# Patient Record
Sex: FEMALE | Race: WHITE
[De-identification: names, ages, dates, MRNs, and addresses within clinical notes are randomized per-mention and may not be internally consistent; named-entity substitution may affect disease eponyms.]

---

## 2018-02-09 NOTE — DIAG
[facility]
                                    [address] [facility] [address]
                                  [place], [place]  [address]
_________________________________________________________________________________________
                                                                 Draft    
 
 
DATE OF STUDY:
02/09/2018
 
STUDY:
Holter monitor.
 
INDICATION:
This is a 62-year-old female referred for evaluation of palpitations.
 
SUMMARY REPORT:
The patient was monitored for 48 hours and 13 minutes.  There was total of 10 hours of
artifact, total beats recorded 130,956 beats.  The patient was predominantly in sinus
rhythm with average heart rate of 56 beats per minute.  Minimum heart rate 42 beats per
minute, noted to be sinus bradycardia and maximum heart rate of 100 beats per minute.
Arrhythmia was noted as rare premature atrial and ventricular contractions. 
 
CONCLUSION:
1. This is a 48-hour Holter monitor, however, there was 10 hours of artifact that were
removed, total beats recorded 130,956 beats. 
2. The patient was predominantly in sinus rhythm with poor correlation between patient
triggered events and recorded arrhythmia. 
3. Average heart rate was 56 beats per minute, minimum heart rate 42 beats per minute,
and maximum heart rate 100 beats per minute. 
4. Arrhythmia was noted as rare premature atrial and ventricular contraction.
 
 
 
            ________________________________________
            Dustin Demarco MD MA/KAREN
Job #:  [id number]
DD:  02/13/2018 18:58:47
DT:  02/14/2018 03:31:15
 
MEIR Burton M.D.
 
 
 
 
 
 
 
 
                                                                                    
_________________________________________________________________________________________
PATIENT NAME:     CRYSTAL PHILLIP                    
MEDICAL RECORD #: [id number]                     DIAGNOSTIC STUDY              
          ACCT #: [id number]  
DATE OF BIRTH:   [birth year]                                       
PHYSICIAN:   DUSTIN DEMARCO MD                REPORT #: [id number]
REPORT IS CONFIDENTIAL AND NOT TO BE RELEASED WITHOUT AUTHORIZATION

## 2018-02-10 NOTE — EKG
[facility]
                                    [address]
                                  [place]  [address]
_________________________________________________________________________________________
                                                                 Signed   
 
 
Normal sinus rhythm
Possible Left atrial enlargement
Borderline ECG
No previous ECGs available
Confirmed by OLAMIDE WILSON MD ([id number]) on 2/10/2018 5:47:54 PM
 
 
 
 
 
 
 
 
 
 
 
 
 
 
 
 
 
 
 
 
 
 
 
 
 
 
 
 
 
 
 
 
 
 
 
 
 
 
 
 
    Electronically Signed By: OLAMIDE WILSON MD  02/10/18 1748
_________________________________________________________________________________________
PATIENT NAME:     CRYSTAL PHILLIP                    
MEDICAL RECORD #: [id number]                     Electrocardiogram             
          ACCT #: [id number]  
DATE OF BIRTH:   [birth year]                                       
PHYSICIAN:   OLAMIDE WILSON MD           REPORT #: [id number]
REPORT IS CONFIDENTIAL AND NOT TO BE RELEASED WITHOUT AUTHORIZATION

## 2018-10-05 NOTE — NUR
10/05/18 Magdalena Mei
1152- PT ARRIVES TO PACU WITH EYES CLOSED, ASLEEP. PT RESPONDS TO
VERBAL STIMULI AND OPENS EYES. PT ABLE TO ANSWER QUESTIONS AND STATES
THAT SHE DOES NOT HAVE PAIN OR NAUSEA. PT STABLE ON RA SATING 90-93%.

## 2018-10-06 NOTE — OR
[facility]
                                    [address], [place]  [address]
_________________________________________________________________________________________
                                                                 Signed   
 
 
DATE OF OPERATION:
10/05/2018
 
SURGEON:
Leidy Gr MD
 
PREOPERATIVE DIAGNOSIS:
Persistent weight loss and poor appetite.
 
POSTOPERATIVE DIAGNOSES:
1. Prepyloric antral gastritis.
2. Normal-appearing colon.
 
PROCEDURES PERFORMED:
1. Esophagogastroduodenoscopy with biopsy.
2. Total colonoscopy.
 
ANESTHESIA:
Intravenous sedation fentanyl 200 mcg, Versed 10 mg.
 
INDICATION:
This 63-year-old white woman is a patient of Dr. Burton and well known to me from the
past.  She works as an emergency room nurse, semi-retired now.  She continues to smoke
one pack of cigarettes a day.  In the past six months she has lost about 30 pounds of
weight.  She has had decreased appetite, but no associated abdominal pain.  She feels
"sick at the thought of food."  She has had some iron deficiency, which has been treated
with iron medication, which improved her symptoms.  She has chronic back pain and spinal
infusion device with Dilaudid.  This is being weaned.  She still smokes a pack of
cigarettes a day, though she underwent a rather extensive evaluation in the past few
years for mediastinal lymphadenopathy and periesophageal lymphadenopathy, which seemed
to resolve with further followup.  She is admitted at this time to undergo upper
endoscopy, and colonoscopy to better characterize her current symptoms.  She understands
the risks of bleeding, infection, and perforation related to colonoscopy and upper
endoscopy and wished to proceed. 
 
FINDINGS:
Upper endoscopy showed no abnormality other than mild prepyloric antral gastritis.  She
had no ulceration or neoplasm.  No sign of lymphoma. 
 
Colonoscopy showed a well prepped bowel.  There was no sign of polyps, diverticular
formation, colitis, or cancer. 
 
 
    Electronically Signed By: LEIDY GR MD  10/06/18 0908
_________________________________________________________________________________________
PATIENT NAME:     CRYSTAL PHILLIP                    
MEDICAL RECORD #: [id number]            OPERATIVE REPORT              
          ACCT #: [id number]  
DATE OF BIRTH:   [birth year]            REPORT #: [id number]      
PHYSICIAN:        LEIDY GR MD                 
PCP:              NIESHA BURTON MD      
REPORT IS CONFIDENTIAL AND NOT TO BE RELEASED WITHOUT AUTHORIZATION
 
 
                                  [facility]
                                    [address], [place]  [address]
_________________________________________________________________________________________
                                                                 Signed   
 
 
She does have a fair amount of tolerance to pain medication on the basis of her infusion
device as may have been expected. 
 
DESCRIPTION OF PROCEDURE:
The patient was brought to the endoscopy suite, and given topical Hurricaine spray
hypopharyngeal anesthesia, and placed in lateral decubitus position, and given
intravenous sedation to the point of slurred speech, and nystagmus.  An Olympus video
upper endoscope was passed in the hypopharynx after placement of a bite block.
Esophagus status length was normal.  Scope was advanced to the stomach, which was
insufflated with air.  Gastric juices suctioned free.  There was no sign of bile within
the stomach.  The pylorus appeared reasonably normal.  Scope was passed through into the
duodenum, which was normal.  Scope was withdrawn in the prepyloric antrum, had mild
antral gastritis, but not much and certainly no ulceration or neoplasm.  Biopsies were
obtained for both KRYSTINA and pathologic testing.  Retroflexed view was undertaken showing a
reasonable flap valve in proximal stomach, otherwise normal.  Scope was straightened,
withdrawn to the distal esophagus, and plans made for colonoscopy. 
 
The table was rotated and additional sedation given and digital rectal examination
undertaken, which was normal.  An Olympus video colonoscope was passed in the rectum and
manipulated throughout the colon.  She had a fair amount of discomfort requiring
additional sedation no doubt related to tolerance from her usual medications of
Duramorph through infusion.  Ultimately, the right colon was entered and the cecum was
visualized, but not intubated proper.  Further attempts at intubation were not likely
possible, and definitely not well tolerated given her resistance to pain medications.
The scope was withdrawn from that point and, examination throughout showed no sign of
abnormality.  The scope was removed, and the patient was taken to recovery room in good
condition. 
 
CONCLUDING DIAGNOSIS:
Mild amount of prepyloric antral gastritis.  No sign of ulcer or neoplasm.  Normal colon.
 
PLAN:
On short-term we will recommend Prilosec 20 mg p.o. daily.  An evaluation of ultrasound
and CCK-HIDA test has been outlined, and will confirm that those tests are done. 
 
 
 
            ________________________________________
            MD EMMETT Sharpe/MODL
 
    Electronically Signed By: LEIDY GR MD  10/06/18 0908
_________________________________________________________________________________________
PATIENT NAME:     CRYSTAL PHILLIP                    
MEDICAL RECORD #: [id number]            OPERATIVE REPORT              
          ACCT #: [id number]  
DATE OF BIRTH:   [birth year]            REPORT #: [id number]      
PHYSICIAN:        LEIDY GR MD                 
PCP:              NIESHA BURTON MD      
REPORT IS CONFIDENTIAL AND NOT TO BE RELEASED WITHOUT AUTHORIZATION
 
 
                                  [address]  [address]
_________________________________________________________________________________________
                                                                 Signed   
 
 
Job #:  [id number]
DD:  10/05/2018 12:04:45
DT:  10/05/2018 18:40:24
 
cc:            Dr. Burton.
 
 
Copies:                                
~
 
 
 
 
 
 
 
 
 
 
 
 
 
 
 
 
 
 
 
 
 
 
 
 
 
 
 
 
 
 
 
 
 
 
    Electronically Signed By: LEIDY GR MD  10/06/18 0908
_________________________________________________________________________________________
PATIENT NAME:     CRYSTAL PHILLIP                    
MEDICAL RECORD #: [id number]            OPERATIVE REPORT              
          ACCT #: [id number]  
DATE OF BIRTH:   [birth year]            REPORT #: [id number]      
PHYSICIAN:        LEIDY GR MD                 
PCP:              NIESHA BURTON MD      
REPORT IS CONFIDENTIAL AND NOT TO BE RELEASED WITHOUT AUTHORIZATION

## 2021-11-14 ENCOUNTER — HOSPITAL ENCOUNTER (EMERGENCY)
Dept: HOSPITAL 46 - ED | Age: 66
LOS: 1 days | Discharge: HOME | End: 2021-11-15
Payer: MEDICARE

## 2021-11-14 VITALS — WEIGHT: 136 LBS | HEIGHT: 63 IN | BODY MASS INDEX: 24.1 KG/M2

## 2021-11-14 DIAGNOSIS — E78.00: ICD-10-CM

## 2021-11-14 DIAGNOSIS — Z20.822: ICD-10-CM

## 2021-11-14 DIAGNOSIS — F17.200: ICD-10-CM

## 2021-11-14 DIAGNOSIS — R50.9: ICD-10-CM

## 2021-11-14 DIAGNOSIS — L98.9: ICD-10-CM

## 2021-11-14 DIAGNOSIS — Z79.899: ICD-10-CM

## 2021-11-14 DIAGNOSIS — F32.9: Primary | ICD-10-CM

## 2021-11-14 DIAGNOSIS — E03.9: ICD-10-CM

## 2021-11-14 DIAGNOSIS — Z88.8: ICD-10-CM

## 2021-11-14 DIAGNOSIS — Z90.710: ICD-10-CM

## 2021-11-14 DIAGNOSIS — Z90.89: ICD-10-CM

## 2021-11-14 PROCEDURE — C9803 HOPD COVID-19 SPEC COLLECT: HCPCS

## 2021-11-14 PROCEDURE — U0003 INFECTIOUS AGENT DETECTION BY NUCLEIC ACID (DNA OR RNA); SEVERE ACUTE RESPIRATORY SYNDROME CORONAVIRUS 2 (SARS-COV-2) (CORONAVIRUS DISEASE [COVID-19]), AMPLIFIED PROBE TECHNIQUE, MAKING USE OF HIGH THROUGHPUT TECHNOLOGIES AS DESCRIBED BY CMS-2020-01-R: HCPCS

## 2021-11-14 PROCEDURE — A9270 NON-COVERED ITEM OR SERVICE: HCPCS

## 2021-11-14 PROCEDURE — 0T9B30Z DRAINAGE OF BLADDER WITH DRAINAGE DEVICE, PERCUTANEOUS APPROACH: ICD-10-PCS | Performed by: EMERGENCY MEDICINE

## 2021-11-14 NOTE — XMS
PreManage Notification: CRYSTAL PHILLIP MRN:[id number]
 
Security Information
 
Security Events
No recent Security Events currently on file
 
 
 
CRITERIA MET
------------
- [facility]
 
 
CARE PROVIDERS
There are no care providers on record at this time.
 
Mica has no Care Guidelines for this patient.
 
KATELYN VISIT COUNT (12 MO.)
-------------------------------------------------------------------------------------
1 [facility]
-------------------------------------------------------------------------------------
TOTAL 1
-------------------------------------------------------------------------------------
NOTE: Visits indicate total known visits.
 
ED/C VISIT TRACKING (12 MO.)
-------------------------------------------------------------------------------------
11/14/2021 12:38
[facility] [place]
 
TYPE: Emergency
 
COMPLAINT:
- FLU SYMPTOMS
-------------------------------------------------------------------------------------
 
 
INPATIENT VISIT TRACKING (12 MO.)
No inpatient visits to display in this time frame
 
[email or web address]

## 2021-11-15 NOTE — EKG
[facility]
                                    [address]
                                  [place]  [address]
_________________________________________________________________________________________
                                                                 Signed   
 
 
Normal sinus rhythm
Rightward axis
ST \T\ T wave abnormality, consider anterolateral ischemia Abnormal ECG When compared
with ECG of 09-FEB-2018 00:14,
Nonspecific T wave abnormality now evident in Inferior leads
T wave inversion more evident in Anterolateral leads
Confirmed by BUBBA BELL MD ([id number]) on 11/15/2021 4:58:31 AM
 
 
 
 
 
 
 
 
 
 
 
 
 
 
 
 
 
 
 
 
 
 
 
 
 
 
 
 
 
 
 
 
 
 
 
 
 
 
    Electronically Signed By: BUBBA BELL MD  11/15/21 0458
_________________________________________________________________________________________
PATIENT NAME:     CRYSTAL PHILLIP                    
MEDICAL RECORD #: [id number]                     Electrocardiogram             
          ACCT #: [id number]  
DATE OF BIRTH:   [birth year]                                       
PHYSICIAN:   BUBBA BELL MD                   REPORT #: [id number]
REPORT IS CONFIDENTIAL AND NOT TO BE RELEASED WITHOUT AUTHORIZATION

## 2021-11-20 ENCOUNTER — HOSPITAL ENCOUNTER (EMERGENCY)
Dept: HOSPITAL 46 - ED | Age: 66
Discharge: HOME | End: 2021-11-20
Payer: MEDICARE

## 2021-11-20 VITALS — BODY MASS INDEX: 21.97 KG/M2 | WEIGHT: 124.01 LBS | HEIGHT: 63 IN

## 2021-11-20 DIAGNOSIS — Z79.899: ICD-10-CM

## 2021-11-20 DIAGNOSIS — Z90.89: ICD-10-CM

## 2021-11-20 DIAGNOSIS — S09.90XA: Primary | ICD-10-CM

## 2021-11-20 DIAGNOSIS — R10.31: ICD-10-CM

## 2021-11-20 DIAGNOSIS — Z88.8: ICD-10-CM

## 2021-11-20 DIAGNOSIS — F17.200: ICD-10-CM

## 2021-11-20 DIAGNOSIS — Z90.710: ICD-10-CM

## 2021-11-20 DIAGNOSIS — M54.9: ICD-10-CM
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
## 2021-11-20 NOTE — XMS
PreManage Notification: CRYSTAL PHILLIP MRN:[id number]
 
Security Information
 
Security Events
No recent Security Events currently on file
 
 
 
CRITERIA MET
------------
- [facility] - 2 Visits in 30 Days
 
 
CARE PROVIDERS
There are no care providers on record at this time.
 
Mica has no Care Guidelines for this patient.
 
KATELYN VISIT COUNT (12 MO.)
-------------------------------------------------------------------------------------
[facility]
-------------------------------------------------------------------------------------
TOTAL 2
-------------------------------------------------------------------------------------
NOTE: Visits indicate total known visits.
 
ED/[facility] VISIT TRACKING (12 MO.)
-------------------------------------------------------------------------------------
[facility]21 13:[facility]
 
[facility]: [facility]
 
COMPLAINT:
- FALL, BACK PAIN
-------------------------------------------------------------------------------------
11/14/2021 12:38
[facility]
 
TYPE: Emergency
 
COMPLAINT:
- FLU SYMPTOMS
 
DIAGNOSES:
- Hypothyroidism, unspecified
- Acquired absence of other organs
- Major depressive disorder, single episode, unspecified
- Fever, unspecified
- Disorder of the skin and subcutaneous tissue, unspecified
- Allergy status to other drugs, medicaments and biological substances
- Pure hypercholesterolemia, unspecified
- Nicotine dependence, unspecified, uncomplicated
- Other long term (current) drug therapy
- Acquired absence of both cervix and uterus
-------------------------------------------------------------------------------------
 
 
 
INPATIENT VISIT TRACKING (12 MO.)
No inpatient visits to display in this time frame
 
[email or web address]

## 2021-11-24 ENCOUNTER — HOSPITAL ENCOUNTER (INPATIENT)
Dept: HOSPITAL 46 - ED | Age: 66
LOS: 4 days | Discharge: HOME | DRG: 644 | End: 2021-11-28
Attending: INTERNAL MEDICINE | Admitting: INTERNAL MEDICINE
Payer: MEDICARE

## 2021-11-24 VITALS — WEIGHT: 110.45 LBS | BODY MASS INDEX: 19.57 KG/M2 | HEIGHT: 63 IN

## 2021-11-24 DIAGNOSIS — Z66: ICD-10-CM

## 2021-11-24 DIAGNOSIS — E86.0: ICD-10-CM

## 2021-11-24 DIAGNOSIS — E78.00: ICD-10-CM

## 2021-11-24 DIAGNOSIS — R00.1: ICD-10-CM

## 2021-11-24 DIAGNOSIS — Z88.8: ICD-10-CM

## 2021-11-24 DIAGNOSIS — K20.90: ICD-10-CM

## 2021-11-24 DIAGNOSIS — G89.29: ICD-10-CM

## 2021-11-24 DIAGNOSIS — Z98.890: ICD-10-CM

## 2021-11-24 DIAGNOSIS — L98.499: ICD-10-CM

## 2021-11-24 DIAGNOSIS — F17.200: ICD-10-CM

## 2021-11-24 DIAGNOSIS — Z96.652: ICD-10-CM

## 2021-11-24 DIAGNOSIS — B17.9: ICD-10-CM

## 2021-11-24 DIAGNOSIS — I10: ICD-10-CM

## 2021-11-24 DIAGNOSIS — Z90.710: ICD-10-CM

## 2021-11-24 DIAGNOSIS — K29.50: ICD-10-CM

## 2021-11-24 DIAGNOSIS — Z20.822: ICD-10-CM

## 2021-11-24 DIAGNOSIS — K76.9: ICD-10-CM

## 2021-11-24 DIAGNOSIS — Z79.899: ICD-10-CM

## 2021-11-24 DIAGNOSIS — E03.9: Primary | ICD-10-CM

## 2021-11-24 DIAGNOSIS — E78.5: ICD-10-CM

## 2021-11-24 DIAGNOSIS — E87.6: ICD-10-CM

## 2021-11-24 DIAGNOSIS — Z90.49: ICD-10-CM

## 2021-11-24 PROCEDURE — U0003 INFECTIOUS AGENT DETECTION BY NUCLEIC ACID (DNA OR RNA); SEVERE ACUTE RESPIRATORY SYNDROME CORONAVIRUS 2 (SARS-COV-2) (CORONAVIRUS DISEASE [COVID-19]), AMPLIFIED PROBE TECHNIQUE, MAKING USE OF HIGH THROUGHPUT TECHNOLOGIES AS DESCRIBED BY CMS-2020-01-R: HCPCS

## 2021-11-24 PROCEDURE — C9803 HOPD COVID-19 SPEC COLLECT: HCPCS

## 2021-11-24 NOTE — NUR
PT ARRIVED TO ROOM 126 AT 1930 VIA STRETCHER.  PT ABLE TO AMBULATE TO BATHROOM
TO VOID (400ML YELLOW URINE) AND THEN BACK TO BED.  SHE IS SLIGHTLY DROWSY
WITH A FLAT AFFECT, BUT ORIENTED.  DENIES PAIN.  LUNGS CLEAR, RA.  HR REGULAR,
RATE ANKIT IN 40'S.  BOWEL TONES HYPOACTIVE, REPORTS NAUSEA, PRN ZOFRAN GIVEN.
PT HAD 150ML GREEN EMESIS, PRN PHENERGAN GIVEN (6.25MG GIVEN TO TITRATE TO
FULL DOSE AS IT HAD BEEN RECENTLY GIVEN IN E.D.)  SKIN COOL, SCATTERED
ABRASIONS AND SCABS NOTED TO BUE.  IV INTACT AND PATENT.  LR INFUSION STARTED
AT 125ML/HR.  POTASSIUM RIDER FINISHING FROM E.D.  PT HAS CALL LIGHT WITHIN
REACH, DENIES FURTHER NEEDS AT THIS TIME.

## 2021-11-24 NOTE — NUR
IN TO START NEXT INFUSION OF POTASSIUM.  PT CURRENTLY SLEEPING SOUNDLY, NO
APPARENT DISTRESS.  RESPIRATIONS EVEN AND UNLABORED.  HR REMAINS ANKIT IN
40'S, OTHERWISE VITAL SIGNS WNL.

## 2021-11-24 NOTE — XMS
PreManage Notification: CRYSTAL PHILLIP MRN:[id number]
 
Security Information
 
Security Events
No recent Security Events currently on file
 
 
 
CRITERIA MET
------------
- [facility] - 2 Visits in 30 Days
 
 
CARE PROVIDERS
-------------------------------------------------------------------------------------
[facility]     11/22/2021-Current
 
PHONE: [phone]
-------------------------------------------------------------------------------------
 
Mica has no Care Guidelines for this patient.
 
KATELYN VISIT COUNT (12 MO.)
-------------------------------------------------------------------------------------
3 [facility]
-------------------------------------------------------------------------------------
TOTAL 3
-------------------------------------------------------------------------------------
NOTE: Visits indicate total known visits.
 
ED/[facility] VISIT TRACKING (12 MO.)
-------------------------------------------------------------------------------------
11/24/2021 14:17
[facility] [place]
 
TYPE: Emergency
 
COMPLAINT:
- NAUSEA
-------------------------------------------------------------------------------------
11/20/2021 13:13
[facility] [place]
 
TYPE: Emergency
 
COMPLAINT:
- FALL, BACK PAIN
 
DIAGNOSES:
- Other long term (current) drug therapy
- Unspecified injury of head, initial encounter
- Acquired absence of both cervix and uterus
- Nicotine dependence, unspecified, uncomplicated
- Other fall from one level to another, initial encounter
- Acquired absence of other organs
- Right lower quadrant pain
- Allergy status to other drugs, medicaments and biological substances
- Dorsalgia, unspecified
 
-------------------------------------------------------------------------------------
11/14/2021 12:38
[facility]
 
TYPE: Emergency
 
COMPLAINT:
- FLU SYMPTOMS
 
DIAGNOSES:
- Hypothyroidism, unspecified
- Acquired absence of other organs
- Major depressive disorder, single episode, unspecified
- Fever, unspecified
- Disorder of the skin and subcutaneous tissue, unspecified
- Allergy status to other drugs, medicaments and biological substances
- Pure hypercholesterolemia, unspecified
- Nicotine dependence, unspecified, uncomplicated
- Other long term (current) drug therapy
- Acquired absence of both cervix and uterus
-------------------------------------------------------------------------------------
 
 
INPATIENT VISIT TRACKING (12 MO.)
No inpatient visits to display in this time frame
 
[email or web address]

## 2021-11-24 NOTE — NUR
IN TO START NEXT POTASSIUM INFUSION.  PT STIRRING IN BED AND MOANING.  PT
DENIES PAIN OR NAUSEA AT THIS TIME, BUT STATES SHE NEEDS TO GO TO BATHROOM.
PT MUCH MORE UNSTEADY ON FEET AT THIS TIME, IS NOW 1-PA.  PT STUMBLED, THIS RN
ABLE TO INTERVENE TO PREVENT PT FROM FALLING.  PT VOIDED 350ML YELLOW URINE
AND ASSISTED BACK TO BED.  ASSESSMENT COMPLETED.  IV REMAINS INTACT AND
PATENT.  CALL LIGHT AND BELONGINGS WITHIN REACH.

## 2021-11-25 NOTE — NUR
THIS CNA IN ROOM FOR SBA TO BR. PATIENT STEADY ON HER FEET. BACK TO BED, WARM
BLANKET PROVIDED, CALL LIGHT IN EASY REACH.

## 2021-11-25 NOTE — NUR
iv leaking at site attempted to redress iv and tighten connections still
leaking, patient states she is a hard stick and they struggled to place that
line would perfer if they tried to start it with ultrasound

## 2021-11-25 NOTE — NUR
PT TRANSFERED FROM CCU TO [facility], PATIENT ABLE TO AMBULATE TO ROOM, UPON
ARRIVAL COMPLAINING OF NAUSEA PER PATIENT REQUEST PHENEGRAN GIVEN, VSS, NO
OTHER NEEDS AT THE MOMENT, CALL LIGHT WITHIN REACH.

## 2021-11-25 NOTE — NUR
RN IN ROOM TO ANSWER CALL LIGHT, ASSISTED PATIENT TO BATHROOM TO VOID,
STARTING TO FEEL SLIGHTLY SICK PRN ZOFRAN ADMINISTERED, NO OTHER NEEDS AT THE
MOMENT

## 2021-11-25 NOTE — NUR
PLEBOTOMIST LET THIS RN KNOW THAT PT NEEDED TO USE BATHROOM.  PT UP WITH SBA,
REMAINS STEADY ON FEET AT THIS TIME, TO BATHROOM.  VOIDED 450ML YELLOW URINE
AND PROVIDED OWN PERICARE BEFORE RETURNING TO BED.  PT REPORTS NAUSEA IS
IMPROVED SINCE RECEIVING PHENERGAN.  DENIES FURTHER NEEDS AT THIS TIME, CALL
LIGHT WITHIN REACH.  BED ALARM SET FOR SAFETY.

## 2021-11-25 NOTE — NUR
IN TO HANG A NEW BAG OF IVF, PT WOKEN UP BY IV PUMP ALARMING.  PT REPORTS
NAUSEA, PRN PHENERGAN GIVEN.  PT DENIES FURTHER REQUESTS AT THIS TIME.

## 2021-11-25 NOTE — NUR
THIS RN TO ROOM TO CHECK ON PT. 1 PERSON ASSIST UP TO RESTROOM TO VOID, CNA
ASSISTING PT. 1 PERSON ASSIST BACK TO BED. PT CONTINUES TO REPORT NAUSEA.
PT DENIES PAIN. MEDICATION GIVEN. NO ADDITIONAL REQUESTS OR COMPLAINTS. BED
ALARM ON. CALL LIGHT WITHIN REACH. BED RAILS UP.

## 2021-11-25 NOTE — NUR
PT BED ALARM SOUNDING, FOUND PT WALKING TOWARDS BATHROOM. ASSISTED PT TO
BATHROOM TO VOID. PT STATES "I STILL FEEL BAD" UNABLE TO VERBALIZE SPECIFIC
COMPLAINT. ASSISTED PT BACK TO BED X1 ASSIST. BED ALARM ON, CALL LIGHT IN
REACH, SIDE RAILS UP X2. INSTRUCTED PT TO CALL RN WHEN NEEDS ASSIST,
VERBALIZED UNDERSTANDIGNG.

## 2021-11-25 NOTE — NUR
RESPONDED TO BED ALARM.  PT REPORTS NEEDING TO USE BATHROOM.  WALKED INTO
BATHROOM WITH SBA, PT MORE STEADY ON FEET AT THIS TIME.  PT VOIDED 450ML
YELLOW URINE AND PERFORMED OWN PERICARE.  PT NOW BACK IN BED.  PT REPORTS
NAUSEA, PRN ZOFRAN GIVEN.  IV REMAINS INTACT, FLUIDS INFUSING WNL.  DISCUSSED
PLAN FOR ASSESSMENT AT 0400 AND WILL REASSESS NAUSEA AT THAT TIME.  PT DENIES
FURTHER NEEDS.  BED ALARM ON FOR SAFETY.

## 2021-11-25 NOTE — NUR
CALL LIGHT ANSWERED, SBA WITH PATIENT TO BR FOR VOID. PATIENT VERY NAUSEOUS,
BACK TO BED. WARM BLANKETS PROVIDED. CALL LIGHT IN REACH

## 2021-11-25 NOTE — NUR
THIS RN TO ROOM TO CHECK ON PT. PT TALKING ON PHONE. PT DENIES PAIN AND
REPORTS NAUSEA HAS IMPROVED. PT DENIES ADDITIONAL REQUESTS OR COMPLAINTS. PT
UPATED ON PLAN OF CARE AND TRANSFER TO MED/SURG FLOOR. PT VERBALIZES
UNDERSTANDING AND STATES HER QUESTIONS HAVE BEEN ANSWERED. BED RAILS UP. CALL
LIGHT WITHIN REACH. BED ALARM ON.

## 2021-11-25 NOTE — NUR
IN TO CHECK ON PT WHO WAS AWAKE WHEN I ENTERED ROOM.  PT STATES SHE IS FEELING
OK WHEN SHE ISN'T MOVING.  PT DENIES REQUESTS OR NEEDS AT THIS TIME.  BED
ALARM REMAINS ON FOR SAFETY.  POTASSIUM INFUSIONS COMPLETED.  IVF INFUSING
WNL, IV SITE INTACT.

## 2021-11-25 NOTE — NUR
PT RESTING IN BED EYES CLOSED, RELAXED POSTURE, AND FACILA EXPRESSION, RR EVEN
AT 17 BPM, NO DISTRESS NOTED. PT NOT ALERT TO RN INTO ROOM TO INCREASE IVF
RATE TO 125ML/HR PER ORDER.

## 2021-11-25 NOTE — NUR
REPORT CALLED TO HEMANTH GONZALEZ ON MED/SURG. LISA STATES HER QUESTIONS HAVE BEEN
ANSWERED. STERLING, PHYSICAL THERAPIST, FINISHED WORKING WITH PT. REPORTS PT IS
CLEARED FROM PHYSICAL THERAPY, STEADY ON FEET. PT TRANSFERED TO MED/SURG, PT
WAS ABLE TO AMBULATE TO HER ROOM. ALL BELONGINGS TRANSFERED WITH PT.

## 2021-11-25 NOTE — NUR
PATIENT STATES SHE FEELS BETTER AND IS NOT NAUSEOUS ANY MORE, WILLING TO TRY
HAVING SOME BROTH AND JELLO. DENIES ANY OTHER NEED AT THE MOMENT

## 2021-11-25 NOTE — NUR
IN TO CHECK ON PT WHO IS CURRENTLY SLEEPING SOUNDLY.  NO APPARENT DISTRESS,
RESPIRATIONS EVEN AND UNLABORED.  NO APPARENT DISTRESS.  BED ALARM REMAINS ON
FOR SAFETY.

## 2021-11-25 NOTE — NUR
SARA, PTS SISTER CALLED FOR UPDATE. SARA UPDATED ON PT STATUS AND PLAN OF
CARE. SARA VERBALIZES UNDERSTANDING OF PLAN OF CARE AND STATES HER QUESTIONS
HAVE BEEN ANSWERED. SARA ALSO STATES SHE WILL BE BY LATER TODAY TO VISIT WITH
PT.

## 2021-11-25 NOTE — NUR
PHYSICAL THERAPY TO BEDSIDE TO WORK WITH PT. IV FLUSHED AND SALINE LOCKED PER
PROTOCOL, ALCOHOL CAP APPLIED. NO ADDITIONAL REQUESTS OR COMPLAINTS. HEAR
TRATE 70-90 WITH ACTIVITY. PT AMBULATING IN HALLWAY WITH PHYSICAL THERAPIST,
STEADY ON FEET AT THIS TIME.

## 2021-11-25 NOTE — NUR
RN IN ROOM TO GIVE PT PRN PHENGRAN FOR NAUSEA GREGORIA PLACED NEW IV IN R UPPER
ARM, IV FLUIDS RESTARTED, NEW BP MEDS AMINISTERED PATIENT EDUCATED ON NEW
MEDS.

## 2021-11-25 NOTE — NUR
MORNING ASSESSMENT AND MEDICAITON DUE. PT RESTING IN BED WITH EYES CLOSED. PT
AWAKENS TO MOVEMENT IN THE ROOM. PT CONTINUES TO REPORT NASUEA, SEE MAR FOR
MEDICATION GIVEN. PT REPORTS 3/10 BACK PAIN, IMPLANTED DILAUDID PAIN PUMP
NOTED IN PTS ABDOMEN, AT RIGHT UPPER QUADRANT. PT REPORTS THIS IS MANAGED BY
HER DOCTORS IN [place]. PT DENIES NEED FOR ADDITONAL PAIN CONTROL AT THIS
TIME. PT ORIENTED TO ALL, REMAINS DROWSY REPORTING "I JUST WANT TO SLEEP. FLAT
AFFECT NOTED. PT UNSTEADY ON HER FEET. BED ALARM ON FOR SAFETY. LUNG SOUNDS
CLEAR. HEART TONES REGULAR WITH SINUS BRADYCARDIA NOTED ON MONITOR. HEART RATE
40-50'S. BLOOD PRESSURE REMAINS ELEVATED, MD AWARE. PT REPORTS SHE HAS NOT HAD
ISSUES IN THE PAST WITH HIGH BLOOD PRESSURE. SKIN NOW WARM TO TOUCH, PT
HOWEVER REPORTS FEELING COLD. PT REPORTS HISTORY OF CONSTIPATION, DENIES
FEELINS OF CONSTIPATION AT THIS TIME. BOWEL TONES REMAIN HYPOACTIVE. PT
REPORTS OVER ALL SHE FEELS "MAYBE A LITTLE BIT BETTER." BACETRACIN OINMENT
APPLIED TO SORES ON LEFT AND RIGHT ELBOWS. PT DENIES ADDITIONAL REQUESTS OR
COMPLAINTS, IS ABLE TO TAKE HER PO MEDICATION. HEAD OF BED ELEVATED TO 25
DEGREES. BED RAILS UP. BED ALARM ON. CALL LIGHT WITHIN REACH.

## 2021-11-25 NOTE — NUR
PT RESTING IN BED REPORTS NAUSEA, ATIVAN 1MG IV GIVEN PRN AS #3 OPTION AS
OTHER OPTIONS NOT AVAILABLE AT THIS TIME. PT HAS NO EMESIS, V/S STABLE AT THIS
TIME. SHE HAS NO OTHER CONCERNS OR REQUESTS AT THIS TIME.

## 2021-11-26 NOTE — NUR
PT HAS BEEN UP TO BATHROOM 3 TIMES OVER SHIFT, VOIDING WELL, INCREASE NAUSEA
WITH ACTIVITY, ATIVAN, ZOFRAN AND PHENERGAN GIVEN ONCE EACH
OVER SHIFT FOR NAUSEA, SHE HAD ON EPISODE OF DRY HEAVE WITH NO EMESIS, PT HAS
SLEPT INTERMITTENLY OVER SHIFT. VOIDING QUANTITY SUFFICIENT. PT HAD BM LAST ON
11/24/21.

## 2021-11-26 NOTE — NUR
PT CALLED NURSES STATION TO REQUEST WITH ASSISTANCE TO THE BATHROOM, ONE
PERSON STANDBY ASSIST. VOIDED 500ML CLEAR YELLOW URINE, PT VERBALIZED NO OTHER
NEEDS AT THIS TIME, SHE GAVE NO COMPLAINTS OF NAUSEA OR PAIN.

## 2021-11-26 NOTE — NUR
PT ALERT,  ORIENTED AND STATED SHE IS JUST NOT FEELING WELL. WHEN SHE WAKES
UP HAS TROUBLE WITH NAUSEA. PT IS WAITING FOR MORE TEST RESULTS. SHE THINKS
HER THYROID IS "ALL MESSED UP". PT DISCOURAGED, MENTIONED OTHER ISSUES THAT
ARE ADDING A LOT OF STRESS TO HER LIFE. GAVE NOEMY OCONNELLPOST AND HAD PRAYER
WITH PT. GAVE ENCOURAGEMENT, WILL FOLLOW AS NEEDED

## 2021-11-26 NOTE — NUR
PT CALLED TO REPORT BEEPING IN HER ROOM, IV PUMP BATTERY NOT CHARGING,
REPLACED MACHINE, PT REPORTS SHE IS HAVING NAUSEA AGAIN, RN SAID "I WILL GO
GET THE PHENERGAN" PT SAID "PLEASE, I DONT WANT THIS TO GET OUT OF HAND AGAIN"
RN RETURN TO ROOM PT HAD EMESIS BAG AT HAND, SHE HAD ONE SMALL DRY HEAVE AS RN
ADMINISTERING PHENERGAN 12.5 MG DILUTED IN 20ML OF NS AS SLOW PUSH. NO EMESIS
NOTED IN BAG. PT RELAXED BACK IN BED BY FINISH OF IV MED PUSH. NO OTHER
CONCERNS OR REQUESTS AT THIS TIME.

## 2021-11-26 NOTE — NUR
report recieved from night shift rn, patient resting in bed, continues to have
nausea overnight but no emesis, on tele, denies any need at the moment.

## 2021-11-26 NOTE — NUR
Patient is in bed with call light in reach. Patient said she has no requests.
Vitals, I&Os are complete.

## 2021-11-26 NOTE — NUR
rn in toom to administer 1500 meds, patient requesting to go for a walk,
patient ambulated around unit sba toleralted well

## 2021-11-26 NOTE — NUR
rn in to round on pt, patient sitting up in bed tech in room assisting patient
to bathroom, no other needs at the moment

## 2021-11-26 NOTE — NUR
PATIENT RESTING QUIETLY IN LOW FOWLERS POSITION. RESPIRATIONS ARE REGULAR AND
EVEN, EYES ARE CLOSED, AND CALL LIGHT IS IN REACH. NO CURRENT CARE NEEDS ARE
NOTED.

## 2021-11-26 NOTE — NUR
PATIENT CALLED AND REMAINS NAUSEATED AND HAS ASKED TO GET EVERYTHING SHE MAY
BE DUE FOR TO STOP THE NAUSEA. IV PHENERGAN IN 20MLS SALINE ON IV PUMP OVER
10MIN GIVEN AS WELL AS 1MG IV ATIVAN. PATIENT'S IV SITE WNL AND FLUSHES WELL
POST MEDICATION AND REMAINS ON A CONTINOUS RATE. PATIENT REQUESTING LIGHTS
DOWN AND DOES NOT WANT TO BE DISTURBED UNLESS NECESSARY SO SHE CAN SLEEP. CALL
LIGHT IS IN REACH AND URINE IN BATHROOM DUMPED AND RECORDED. PATIENT HAS NO
OTHER CARE NEEDS AT THIS TIME.

## 2021-11-26 NOTE — NUR
rn in room to round on pt, patient in bed resting, complianing of nausea prn
ativan given, able to tolerate dinner tray and ate most, no emesis, states
headache is better no other needs at the moment

## 2021-11-26 NOTE — NUR
rn in room to do morning assessment and give patietn morning meds, patient
sitting up in bed, only took a couple bites of breakfast and states she feels
nauseous, prn phenegran given, no other needs at the moment.

## 2021-11-26 NOTE — NUR
PT CALLED TO REQUEST HELP TO BATHROOM. PT STANDBY ASSIST TO BATHROOM, SHE
VOIDED 600ML YELLOW URINE, SHE ASKED "CAN I HAVE THE PHENERGAN NOW?" THIS RN
CHECKED THE MAR THE ZOFRAN IS AVAILABLE, DISCUSSED WITH PT THAT I WOULD
ADMINISTER THE ZOFRAN FIRST AND IF THAT IS NOT AFFECTIVE THE PHENERGAN WILL BE
AVAILABLE. PT AGREES. WARM BLANKET PROVIDED.

## 2021-11-26 NOTE — NUR
PT REQUESTS NAUSEA MEDICATION, PT ADMINISTERED ZOFRAN AT THIS TIME 4MG IV PRN.
V/S DONE, FOCUS ASSESSMENT COMPLETE. PT VERBALIZED NO OTHER NEEDS OR CONCERNS
AT THIS TIME.

## 2021-11-26 NOTE — NUR
PT complaining of nausea despite getting reglan prior to lunch,prn zofran
provided
only ate
oranges for lunch encouraged ensure protien drink patient dislikes the taste.

## 2021-11-26 NOTE — NUR
PATIENT CALLED AND WANTS MEDICATION FOR NAUSEA AND 4MG IV ZOFRAN GIVEN ALONG
WITH PATIENT'S OTHER PO MEDS THAT PATIENT TOOK WITHOUT DIFFICULTY. PATIENT
DENIES NEED FOR STANDBY ASSISTANCE AND HAS BEEN GOING TO THE RESTROOM
INDEPENDENTLY AND IS UP TO THE BATHROOM AT THIS TIME. PATIENT WILL CALL IF SHE
NEEDS ANYTHING. ICE WATER REFILLED.

## 2021-11-26 NOTE — NUR
PT CALLED [facility]UR[facility]ES [facility] TO REPORT BEEPING NOISE IN ROOM. RN INTO ROOM, IVF
COMPLETE, NEW BAG OF LR REPLACED AND STARTED, V/S AND ASSESSMENT COMPLETE.

## 2021-11-26 NOTE — NUR
PATIENT RESTING QUIELTY IN BED WATCHING TV. THIS RN RECEIVING REPORT FROM
HEMANTH GONZALEZ. PATIENT HAS NO CURRENT CARE NEEDS AT THIS ITME. CALL LIGHT IS IN
REACH.

## 2021-11-27 NOTE — NUR
report recieved from night shift RN, patient resting in bed call light within
reach no needs at the moment.

## 2021-11-27 NOTE — NUR
PATIENT CONTINUES TO REST QUIETLY, RESPIRATIONS ARE REGULAR AND EVEN, AND EYES
ARE CLOSED. PATIENT IN LOW FOWLERS POSITION AND CALL LIGHT IS IN REACH.

## 2021-11-27 NOTE — NUR
rn in room to assess pt and do morning med pass finishing breakfast states she
feels nausea prn zofran given, no other needs at the moment

## 2021-11-27 NOTE — NUR
THIS RN RECEIVED SHIFT REPORT FROM HEMANTH GONZALEZ. PATIENT RESTING QUIETLY IN BED
AND HEMANTH GONZALEZ HANGING NEW BAG OF IV FLUIDS. PATIENT HAS NO CURRENT CARE NEEDS.
CALL LIGHT IS IN REACH.

## 2021-11-27 NOTE — NUR
AM VS AND ASSESSMENT COMPLETE AND THE ONLY THING THAT CHANGED IN THE
ASSESSMENT WAS PATIENT'S BOWEL SOUNDS ARE MORE ACTIVE NOW. PATIENT AGAIN
SEVERELY NAUSEATED AND IV PROMETHAZINE GIVEN PER PROTOCAL IN 20MLS NS AND ON
THE PUMP. IV ATIVAN ALSO GIVEN. PATIENT SAID SHE WAS ABLE TO GET SOME SLEEP
AFTER HER LAST MEDICATION. LIGHTS TURNED DOWN AT PATIENT'S REQUEST AND CALL
LIGHT IS IN REACH.

## 2021-11-27 NOTE — NUR
PATIENT CALLED AND IS NAUSEATED AGAIN AND WANTS TO GO TO SLEEP. 1MG IV ATIVAN
GIVEN AND PATIENT'S WATER DUMPED AT HER REQUEST AND SHE IS GOING NPO AT THIS
TIME. PATIENT HAS NO OTHER NEEDS AT THIS TIME AND CALL LIGHT IS IN REACH.
PATIENT WILL CALL IF NAUSEA DOES NOT SUBSIDE.

## 2021-11-27 NOTE — NUR
IN TO GET VITALS, I&Os DONE, FRESH ICE WATER, PT C/O MARYAM, RN MADE AWARE, NO
FURTHER NEEDS AT THIS TIME

## 2021-11-27 NOTE — NUR
PATIENT SLEEPING WHEN THIS RN CAME IN. PATIENT HAS VERY LITTLE NAUSEA AT THIS
TIME AMD 4MG IV ZOFRAN WAS GIVEN. DISCUSSED WITH PATIENT THAT I COULD NOT
GIVE THE PHENERGAN AND ATIVAN AT THE SAME TIME TONIGHT LIKE I DID ON MY
PREVIOUS SHIFT AS PATIENT HAD REQUESTED AND THAT ALL NAUSEA MEDICATIONS NEEDED
TO BE GIVEN AT SEPERATE TIMES. PATIENT VERBALIZED UNDERSTANDING, BUT IS
WORRIED SHE WILL NOT BE ABLE TO SLEEP NOW. ASSESSMENT COMPLETE. VS STABLE
EXCEPT FOR SOME HTN WHICH IS NOT HIGH ENOUGH TO CALL THE MD. PATIENT
RESQUESTED A NEW CUP OF HOT CHOCOLATE AND THIS WAS GIVEN WITH SOME DRY TOAST
PATIENT ALREADY HAD. PATIENT HAS NO OTHER NEEDS AT THIS TIME. CALL LIGHT IS IN
REACH AND LIGHTS TURNED DOWN.

## 2021-11-27 NOTE — NUR
PATIENT RESTING QUIETLY IN LOW FOWLERS POSITION TURNED TOWARDS HER RIGHT SIDE.
PATIENT'S EYES ARE CLOSED AND RESPIRATIONS ARE REGULAR AND EVEN. CALL LIGHT IS
IN REACH. PATIENT HAS NO CURRENT CARE NEEDS.

## 2021-11-27 NOTE — NUR
RN IN ROOM TO ROUND ON PT, PT SITTING UP IN BED SAYS SHE FEELS SICK, PRN
ATIVAN GIVEN, NO OTHER NEEDS AT THE MOMENT TRYING TO EAT DINNER

## 2021-11-27 NOTE — NUR
PATIENT RESTING IN LOW FOWLERS POSITION, RESPIRATIONS ARE REGLAR AND EVEN,
EYES ARE CLOSED, AND CALL LIGHT IS IN REACH. NO CARE NEEDS NOTED AT THIS TIME.

## 2021-11-27 NOTE — NUR
PT UP WALKING IN THE HALLS ABLE TO AMBULATE INDEPENDENTLY, COMPLAINING OF
FEELING NAUSEA ZOFRAN GIVEN, NO EMESIS.

## 2021-11-28 NOTE — NUR
PATIENT CALLED AND REMAINS NAUSEATED. 4MG IV ZOFRAN GIVEN. PATIENT ASKING WHEN
SHE CAN HAVE THE ATIVAN AND PHENERGAN AGAIN. INFORMED THE PATIENT OF THE NEXT
DOSE TIMES FOR BOTH THESE MEDICATIONS AND SHE IS WORRIED AS SHE DOES NOT THINK
THE ZOFRAN WILL WORK. INFORMED PATIENT IF SHE IS NOT FEELING BETTER IN HALF AN
HOUR WE WOULD LOOK AT CALLING THE MD. PATIENT AGREED WITH THIS PLAN. LIGHTS
TURNED DOWN AND CALL LIGHT ISIN REACH.

## 2021-11-28 NOTE — NUR
CALL LIGHT ON. pt REQUESTED PRN NAUSEA MEDS. DISCUSSED NAUSEA MED MANAGEMENT.
pt VERBALIZED UNDERSTANDING OF NEXT MEDICATION AND TIMING. NO FURTHER
REQUESTS. CALL LIGHT WITHIN REACH. PRIMARY RN UPDATED.

## 2021-11-28 NOTE — NUR
PATIENT GIVEN 1MG ATIVAN SIVP. PATIENT BACK IN BED AND AM ASSESSMENT COMPLETE,
VS ARE STABLE, PATIENT VOIDING QUANTITY SUFFICIENT, CALL LIGHT IN REACH, AND
LIGHTS TURNED DOWN AT PATIENT'S REQUEST. PATIENT WOULD LIKE TO NOT BE
DISTURBED FOR AS LONG AS POSSIBLE.

## 2021-11-28 NOTE — NUR
PATIENT IS RESTING QUIETLY IN SEMI-FOWLERS POSITION. RESPIRATIONS ARE REGULAR
AND EVEN, EYES ARE CLOSED, AND CALL LIGHT IN REACH. PATIENT HAS NO CURRENT
CARE NEEDS AT THIS TIME.

## 2021-11-28 NOTE — NUR
PATIENT UP WALKIING IN THE HALLS WITH C/O SEVERE NAUSEA. TOO SOON TO GIVE ANY
MEDICATIONS YET. THIS RN CALLED  AND ORDER GIVEN TO GIVE ATIVAN DOSE
EARLY. PATIENT HEADED BACK TO HER ROOM AND THIS RN WILL MEET HER THERE.

## 2021-11-28 NOTE — NUR
PATIENT CALLED AND NAUSEA HAS NOT IMPROVED AND PATINE WANTS SOMETHING ELSE.
1MG IV ATIVAN GIVE AS IT SEEMS TO WORK THE BEST FOR PATIENT'S NAUSEA. PATIENT
HAS NO OTHER CARE NEEDS AT THIS TIME. CALL LIGHT IS IN REACH.

## 2021-11-28 NOTE — NUR
PATIENT STILL NAUSEATED AND 12.5MG PHENERGAN IN 20MLS/NS ON THE IV PUMP OVER
10 MINUTES GIVEN WITH AM THYROID MED AND A SIP OF WATER. PATIENT REMAINS NPO
OTHER WISE. PATIENT HOPES SHE IS GOING TO BE ABLE TO GET SOME SLEEP. CALL
LIGHT IN REACH AND LIGHTS TURNED DOWN.

## 2022-11-03 ENCOUNTER — HOSPITAL ENCOUNTER (OUTPATIENT)
Dept: HOSPITAL 46 - DS | Age: 67
Discharge: HOME | End: 2022-11-03
Attending: SURGERY
Payer: MEDICARE

## 2022-11-03 VITALS — WEIGHT: 114.2 LBS | BODY MASS INDEX: 20.23 KG/M2 | HEIGHT: 63 IN

## 2022-11-03 DIAGNOSIS — K29.70: Primary | ICD-10-CM

## 2022-11-03 DIAGNOSIS — R10.11: ICD-10-CM

## 2022-11-03 DIAGNOSIS — R63.4: ICD-10-CM

## 2022-11-03 DIAGNOSIS — E78.5: ICD-10-CM

## 2022-11-03 DIAGNOSIS — Z72.0: ICD-10-CM

## 2022-11-03 DIAGNOSIS — E11.9: ICD-10-CM

## 2022-11-03 DIAGNOSIS — K20.90: ICD-10-CM

## 2022-11-03 DIAGNOSIS — K44.9: ICD-10-CM

## 2022-11-03 DIAGNOSIS — R59.0: ICD-10-CM

## 2022-11-03 PROCEDURE — 0DB68ZX EXCISION OF STOMACH, VIA NATURAL OR ARTIFICIAL OPENING ENDOSCOPIC, DIAGNOSTIC: ICD-10-PCS | Performed by: SURGERY

## 2022-11-03 PROCEDURE — 0DB98ZX EXCISION OF DUODENUM, VIA NATURAL OR ARTIFICIAL OPENING ENDOSCOPIC, DIAGNOSTIC: ICD-10-PCS | Performed by: SURGERY

## 2022-11-03 PROCEDURE — 0DB38ZX EXCISION OF LOWER ESOPHAGUS, VIA NATURAL OR ARTIFICIAL OPENING ENDOSCOPIC, DIAGNOSTIC: ICD-10-PCS | Performed by: SURGERY

## 2022-11-03 PROCEDURE — G0500 MOD SEDAT ENDO SERVICE >5YRS: HCPCS

## 2022-11-03 PROCEDURE — 0DB18ZX EXCISION OF UPPER ESOPHAGUS, VIA NATURAL OR ARTIFICIAL OPENING ENDOSCOPIC, DIAGNOSTIC: ICD-10-PCS | Performed by: SURGERY

## 2022-11-03 PROCEDURE — 0DB28ZX EXCISION OF MIDDLE ESOPHAGUS, VIA NATURAL OR ARTIFICIAL OPENING ENDOSCOPIC, DIAGNOSTIC: ICD-10-PCS | Performed by: SURGERY

## 2022-11-03 NOTE — NUR
11/03/22 1439 Verna Teran
1435 PATIENT ARRIVES TO PACU RESTING WITH EYES CLOSED. AWAKENS WITH
VERBAL STIMULI, BACK TO SLEEP WHEN NOT STIMULATED. RESP EVEN AND
UNLABORED, NC AT 2 LITERS TURNED OFF ON ARRIVAL TO PACU.

## 2022-11-08 NOTE — OR
[facility]
                                    [address], [place]  [address]
_________________________________________________________________________________________
                                                                 Signed   
 
 
DATE OF OPERATION:
11/03/2022
 
SURGEON:
Leidy Gr MD
 
PREOPERATIVE DIAGNOSES:
Weight loss (down to 114 pounds), episodic right upper abdominal pain.  Previous biliary
evaluation negative and CT scan negative November 2021. 
 
POSTOPERATIVE DIAGNOSES:
1. Small hiatal hernia.
2. Mild antral gastritis.
 
PROCEDURE:
Esophagogastroduodenoscopy with biopsy.
 
ANESTHESIA:
Intravenous sedation; fentanyl 100 mcg and Versed 5 mg.
 
INDICATION:
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  She is well known to me from the past and is a patient of Dr. Burton.  She
was referred for upper endoscopy to better characterize the issue for poor appetite.
She has undergone biliary evaluation in the past, which was negative including CCK-HIDA
test and ultrasound.  A CT scan was performed in recent times, which was essentially
negative.  She does have episodic right upper abdominal pain that is not consistent by
any means.  Mostly she has poor appetite for which her weight loss could be attributed.
She has had thickening of the duodenum and the esophagus on previous imaging studies and
she is referred for upper endoscopy to better characterize the problem and assess for
neoplastic disease. 
 
FINDINGS:
There is no evidence of malignancy of esophagus, stomach or duodenum.  She did have a
small hiatal hernia.  There was no evidence of Gottlieb's epithelium.  There was mild
antral gastritis but certainly no ulceration.  The duodenum appeared normal.  CLOtest
was negative 15 minutes post procedure. 
 
DESCRIPTION OF PROCEDURE:
The patient was brought to the endoscopy suite and placed in lateral decubitus position,
given intravenous sedation to the point of slurred speech and nystagmus.  A bite block
was placed.  An Olympus video upper endoscope was passed in the hypopharynx.  The vocal
 
    Electronically Signed By: LEIDY GR MD  11/08/22 0935
_________________________________________________________________________________________
PATIENT NAME:     CRYSTAL PHILLIP                    
MEDICAL RECORD #: [id number]            OPERATIVE REPORT              
          ACCT #: [id number]  
DATE OF BIRTH:   [birth year]            REPORT #: [id number]      
PHYSICIAN:        LEIDY GR MD                 
PCP:              NIESHA BURTON MD      
REPORT IS CONFIDENTIAL AND NOT TO BE RELEASED WITHOUT AUTHORIZATION
 
 
                                  [facility]
                                    [address], [place]  [address]
_________________________________________________________________________________________
                                                                 Signed   
 
 
cords were normal.  The posterior commissure had inflammatory changes consistent with
probable reflux.  The scope was advanced to the esophagus, throughout its length it
appeared essentially normal.  There was no Gottlieb's epithelium, stricture, neoplasm or
varices.  The scope was passed to the stomach, which was insufflated with air.  Rugal
folds appeared normal.  There was no sign of excessive bile.  The antrum had mild
inflammatory change with no sign of ulcer or neoplasm.  The pylorus was normal. Scope
was passed through into the duodenum, which was normal.  Biopsies were taken of the 2nd
and bulbar portions.  The scope was withdrawn and biopsies taken of the antrum and more
proximal stomach.  There did appear to be mild antral gastritis and possibly some mild
proximal gastritis.  Rugal folds were normal.  Retroflexed view did confirm a small
hiatal hernia.  Scope was withdrawn and biopsies taken of the distal esophagus and mid
esophagus as well.  Scope was removed and the patient was taken to the recovery room in
good condition. 
 
CONCLUDING DIAGNOSIS:
Hiatal hernia with mild antral gastritis.
 
PLAN:
We will add Carafate to her current regimen of Prilosec daily.  We will give 1 g p.o.
q.i.d. on empty stomach and see back in three or four weeks.  We will reassess regarding
possible biliary symptoms as well. 
 
 
 
            ________________________________________
            MD EMMETT Sharpe/FABIANL
Job #:  [id number]
DD:  11/03/2022 14:42:04
DT:  11/03/2022 16:52:21
 
cc:            Niesha Burton MD
 
 
Copies:  NIESHA BURTON MD
~
 
 
 
 
 
    Electronically Signed By: LEIDY GR MD  11/08/22 0935
_________________________________________________________________________________________
PATIENT NAME:     CRYSTAL PHILLIP                    
MEDICAL RECORD #: [id number]            OPERATIVE REPORT              
          ACCT #: [id number]  
DATE OF BIRTH:   [birth year]            REPORT #: [id number]      
PHYSICIAN:        LEIDY GR MD                 
PCP:              NIESHA BURTON MD      
REPORT IS CONFIDENTIAL AND NOT TO BE RELEASED WITHOUT AUTHORIZATION

## 2022-11-09 NOTE — PATH
[facility]
                                    [address], [place]  [address]
_________________________________________________________________________________________
                                                                 Signed   
 
 
 
SPECIMEN(S): A DUODENAL BIOPSY
SPECIMEN(S): B ANTRUM/PYLORUS BIOPSY
SPECIMEN(S): C DISTAL ESOPHAGEAL BIOPSY
SPECIMEN(S): D PROXIMAL STOMACH BIOPSY
SPECIMEN(S): E MID ESOPHAGEAL BIOPSY
 
SPECIMEN SOURCE:
A. DUODENAL BIOPSY
B. ANTRUM/PYLORUS BIOPSY
C. DISTAL ESOPHAGEAL BIOPSY
D. PROXIMAL STOMACH BIOPSY
E. MID ESOPHAGEAL BIOPSY
 
CLINICAL HISTORY:
Pre: Upper RQ pain, weight loss.  Post:  Hiatal hernia, mild antral gastritis.
 
FINAL PATHOLOGIC DIAGNOSIS:
A.  Duodenum, biopsy:
-  No significant histopathology.
B.  Antrum/pylorus, biopsy:
-  No significant histopathologic alterations.
C.  Distal esophagus, biopsy:
-  Chronic esophagitis.
-  No evidence of Gottlieb's esophagus.
D.  Proximal stomach, biopsy:
-  No significant histopathologic alterations.
E.  Mid esophagus, biopsy:
-  Chronic esophagitis.
-  No evidence of Gottlieb's esophagus.
-  One fragment of normal appearing gastric mucosa is present.
 
COMMENT:
Regarding specimen A, the sections from the duodenal biopsy show portions of 
duodenal mucosa with long finger-like villi.  There is no villous atrophy, 
crypt hyperplasia or intraepithelial 
lymphocytosis, making a diagnosis of celiac disease unlikely.  There is no 
evidence of peptic duodenitis, microorganisms, abnormal infiltrates or 
neoplasia. 
Regarding specimen B, the sections through the gastric biopsies show fragments 
of histologically unremarkable antral mucosa.  There is no evidence of acute or 
chronic inflammation.  There is no 
 
                                                                                    
_________________________________________________________________________________________
PATIENT NAME:     CRYSTAL PHILLIP                    
MEDICAL RECORD #: [id number]            PATHOLOGY                     
          ACCT #: [id number]       ACCESSION #: [id number]     
DATE OF BIRTH:   [birth year]            REPORT #: [id number]       
PHYSICIAN:        MAVIS TORO              
PCP:              NIESHA HUYNH MD      
REPORT IS CONFIDENTIAL AND NOT TO BE RELEASED WITHOUT AUTHORIZATION
 
 
                                  [facility]
                                    [address], [place]  [address]
_________________________________________________________________________________________
                                                                 Signed   
 
 
evidence of H. pylori, intestinal metaplasia, abnormal infiltrates or 
neoplasia. 
Regarding specimen C, the biopsy contains reactive appearing squamous mucosa. 
Chronic inflammation is present. No gastric glandular mucosa is identified. The 
changes are nonspecific and can be seen in 
a variety of settings including infections, gastroesophageal reflux disease or 
other forms of esophagitis. 
 
Regarding specimen D, the sections through the gastric biopsies show fragments 
of histologically unremarkable oxyntic mucosa. There is no evidence of acute or 
chronic inflammation. There is no 
evidence of H. pylori, intestinal metaplasia, abnormal infiltrates or 
neoplasia. 
Regarding specimen E, the biopsy contains reactive appearing squamous mucosa.  
Chronic inflammation is present.  The changes are nonspecific and can be seen 
in a variety of settings, including 
infections and gastroesophageal reflux disease.  There is a small fragment of 
gastric mucosa present.  It is histologically unremarkable.  There is no 
evidence of intestinal metaplasia.  There is no 
evidence of H. pylori.
[facility]:C2NR
 
MICROSCOPIC EXAMINATION:
Histologic sections of all submitted blocks are examined by light microscopy.  
These findings, together with the gross examination, support the pathologic 
diagnosis. 
 
GROSS DESCRIPTION:
Five specimens are received in five containers, labeled "DM."
A. The specimen, labeled "#1 duodenal biopsy," is received in formalin and 
consists of two fragments of tan soft tissue, measuring 0.3-0.4 cm in greatest 
dimension.  The specimen is entirely 
submitted in cassette (A1).
B. The specimen, labeled "#2 antrum/pylorus biopsy," is received in formalin 
and consists of two fragments of tan tissue, measuring 0.3-0.6 cm in greatest 
dimension.  The specimen is entirely 
submitted in cassette (B1).
C.  The specimen, labeled "#3 distal esophageal biopsy," is received in 
formalin and consists of two fragments of tan soft tissue, each measuring 0.5 
cm in greatest dimension.  The specimen is 
entirely submitted in cassette (C1).
D.  The specimen, labeled "#4 proximal stomach biopsy," is received in formalin 
 
                                                                                    
_________________________________________________________________________________________
PATIENT NAME:     CRYSTAL PHILLIP                    
MEDICAL RECORD #: [id number]            PATHOLOGY                     
          ACCT #: [id number]       ACCESSION #: [id number]     
DATE OF BIRTH:   [birth year]            REPORT #: [id number]       
PHYSICIAN:        MAVIS PATHOLOGY              
PCP:              NIESHA HUYNH MD      
REPORT IS CONFIDENTIAL AND NOT TO BE RELEASED WITHOUT AUTHORIZATION
 
 
                                  [facility]
                                    [address], [place]  [address]
_________________________________________________________________________________________
                                                                 Signed   
 
 
and consists of a single fragment of tan soft tissue, measuring 0.3 cm greatest 
dimension.  The specimen is entirely 
submitted in cassette (D1).
 
E.  The specimen, labeled "#5 mid esophageal biopsy," is received in formalin 
and consists of three fragments of tan soft tissue, that range in size from 
0.3-0.4 cm in greatest dimension.  The 
specimen is entirely submitted in cassette (E1).
HH (under the direct supervision of a pathologist)
The Gross Description was prepared using a voice recognition system. The report 
was reviewed for accuracy; however, sound-alike word errors, addition and/or 
deletions may occur. If there is any 
question about this report, please contact Client Services.
 
PERFORMING LABORATORY:
The technical component was performed by [facility], [address] [place] [id number] (CLIA# [id number]). The professional interpretation was 
performed by [facility], [facility], [address] [place] [address] (CLIA#:  [id number]).
 
Diagnostician:  Bryson Case MD
Pathologist
Electronically Signed 11/09/2022
 
 
Copies:                                
~
 
 
 
 
 
 
 
 
 
 
 
 
 
 
 
 
                                                                                    
_________________________________________________________________________________________
PATIENT NAME:     CRYSTAL PHILLIP                    
MEDICAL RECORD #: [id number]            PATHOLOGY                     
          ACCT #: [id number]       ACCESSION #: [id number]     
DATE OF BIRTH:   [birth year]            REPORT #: [id number]       
PHYSICIAN:        MAVIS PATHOLOGY              
PCP:              NIESHA HUYNH MD      
REPORT IS CONFIDENTIAL AND NOT TO BE RELEASED WITHOUT AUTHORIZATION